# Patient Record
Sex: FEMALE
[De-identification: names, ages, dates, MRNs, and addresses within clinical notes are randomized per-mention and may not be internally consistent; named-entity substitution may affect disease eponyms.]

---

## 2024-04-05 PROBLEM — Z00.00 ENCOUNTER FOR PREVENTIVE HEALTH EXAMINATION: Status: ACTIVE | Noted: 2024-04-05

## 2024-04-19 ENCOUNTER — NON-APPOINTMENT (OUTPATIENT)
Age: 54
End: 2024-04-19

## 2024-04-19 ENCOUNTER — APPOINTMENT (OUTPATIENT)
Dept: CARDIOLOGY | Facility: CLINIC | Age: 54
End: 2024-04-19
Payer: MEDICAID

## 2024-04-19 VITALS
HEART RATE: 64 BPM | SYSTOLIC BLOOD PRESSURE: 115 MMHG | RESPIRATION RATE: 16 BRPM | BODY MASS INDEX: 34.66 KG/M2 | HEIGHT: 64 IN | DIASTOLIC BLOOD PRESSURE: 81 MMHG | WEIGHT: 203 LBS

## 2024-04-19 DIAGNOSIS — Z82.49 FAMILY HISTORY OF ISCHEMIC HEART DISEASE AND OTHER DISEASES OF THE CIRCULATORY SYSTEM: ICD-10-CM

## 2024-04-19 DIAGNOSIS — Z78.9 OTHER SPECIFIED HEALTH STATUS: ICD-10-CM

## 2024-04-19 PROCEDURE — 99204 OFFICE O/P NEW MOD 45 MIN: CPT

## 2024-04-19 PROCEDURE — 93000 ELECTROCARDIOGRAM COMPLETE: CPT

## 2024-04-19 PROCEDURE — G2211 COMPLEX E/M VISIT ADD ON: CPT | Mod: NC,1L

## 2024-04-19 RX ORDER — MELOXICAM 15 MG/1
15 TABLET ORAL
Qty: 30 | Refills: 0 | Status: ACTIVE | COMMUNITY
Start: 2024-04-19 | End: 1900-01-01

## 2024-05-02 ENCOUNTER — APPOINTMENT (OUTPATIENT)
Dept: CARDIOLOGY | Facility: CLINIC | Age: 54
End: 2024-05-02
Payer: MEDICAID

## 2024-05-02 PROCEDURE — 93306 TTE W/DOPPLER COMPLETE: CPT

## 2024-05-07 ENCOUNTER — APPOINTMENT (OUTPATIENT)
Dept: CARDIOLOGY | Facility: CLINIC | Age: 54
End: 2024-05-07
Payer: MEDICAID

## 2024-05-07 ENCOUNTER — NON-APPOINTMENT (OUTPATIENT)
Age: 54
End: 2024-05-07

## 2024-05-07 VITALS
BODY MASS INDEX: 34.31 KG/M2 | HEART RATE: 62 BPM | DIASTOLIC BLOOD PRESSURE: 72 MMHG | RESPIRATION RATE: 16 BRPM | WEIGHT: 201 LBS | HEIGHT: 64 IN | SYSTOLIC BLOOD PRESSURE: 118 MMHG

## 2024-05-07 DIAGNOSIS — I83.90 ASYMPTOMATIC VARICOSE VEINS OF UNSPECIFIED LOWER EXTREMITY: ICD-10-CM

## 2024-05-07 DIAGNOSIS — I38 ENDOCARDITIS, VALVE UNSPECIFIED: ICD-10-CM

## 2024-05-07 DIAGNOSIS — M94.0 CHONDROCOSTAL JUNCTION SYNDROME [TIETZE]: ICD-10-CM

## 2024-05-07 PROCEDURE — 93000 ELECTROCARDIOGRAM COMPLETE: CPT

## 2024-05-07 PROCEDURE — 99213 OFFICE O/P EST LOW 20 MIN: CPT

## 2024-05-07 RX ORDER — ASPIRIN 81 MG/1
81 TABLET ORAL DAILY
Refills: 0 | Status: ACTIVE | COMMUNITY

## 2024-05-07 NOTE — HISTORY OF PRESENT ILLNESS
[FreeTextEntry1] : Now reports having history for varicose vein stripping/surgery performed in her home country ([place]) and was prescribed ASA 81 mg daily by her Vascular Surgeon.   Transthoracic Echocardiogram (5/2/2024):  The LV cavity size and wall thickness are normal.  The LV systolic function is normal with LVEF 60 to 65%.  The left and right atria are normal in size and structure. Mild MR.  Moderate TR. Estimated pulmonary artery systolic pressure is 42 mmHg, consistent with mild pulmonary HTN. There was no evidence of pericardial effusion seen.

## 2024-05-07 NOTE — REASON FOR VISIT
[FreeTextEntry1] : The patient is a 54-year-old [place]utanese-speaking woman who came to our office last month for a cardiac consultation with no prior significant cardiac or medical history.  Had recently went to [facility] for a chest pain syndrome prior to cardiac consultation and considered to have costochondritis of her left lateral rib cage under her breast.  She reported that this discomfort worsened with certain movements or laying on her left side.  She admitted to doing a lot of physical labor in her backyard raking up grass/sod and plowing her garden as well as picking up her grandchildren.    She has been applying a heating pad to affected area periodically as well as taking Meloxicam 15 mg PRN for pain relief and reports her atypical chest discomfort has improved but still present from time to time.    She's back today with her  who is translating in [place]ut[place] for a general cardiac checkup and to go over results from most recent echocardiogram.    Again, patient reports feeling better since applying heating pad to affected area and taking NSAID's for pain relief, but her symptoms are still present from time to time especially with movement and/or pushing on the area.  She denies associated symptoms such as exertional substernal CP, SOB, MA, palpitations, presyncope, syncope, edema.

## 2024-05-07 NOTE — ASSESSMENT
[FreeTextEntry1] : EKG 5/7/2024:  Presenting rhythm is sinus, heart rate 62 bpm, PRWP V1 to V3, leftward axis deviation.  Essentially unchanged.   In summary, 54-year-old [place]-speaking woman who came to our office last month for a cardiac consultation with no prior significant cardiac or medical history.  Had recently went to [facility] for a chest pain syndrome prior to cardiac consultation and considered to have costochondritis of her left lateral rib cage under her breast.  She reported that this discomfort worsened with certain movements or laying on her left side.  She admitted to doing a lot of physical labor in her backyard raking up grass/sod and plowing her garden as well as picking up her grandchildren.    She has been applying a heating pad to affected area periodically as well as taking Meloxicam 15 mg PRN for pain relief and reports her atypical chest discomfort has improved but still present from time to time.    She's back today with her  who is translating in [place] for a general cardiac checkup and to go over results from most recent echocardiogram.    Again, patient reports feeling better since applying heating pad to affected area and taking NSAID's for pain relief, but her symptoms are still present from time to time especially with movement and/or pushing on the area.  She denies associated symptoms such as exertional substernal CP, SOB, MA, palpitations, presyncope, syncope, edema.   Now reports having history for varicose vein stripping/surgery performed in her home country ([place]) and was prescribed ASA 81 mg daily by her Vascular Surgeon.   Transthoracic Echocardiogram (5/2/2024):  The LV cavity size and wall thickness are normal.  The LV systolic function is normal with LVEF 60 to 65%.  The left and right atria are normal in size and structure. Mild MR.  Moderate TR. Estimated pulmonary artery systolic pressure is 42 mmHg, consistent with mild pulmonary HTN. There was no evidence of pericardial effusion seen.     PLAN:  1).  Patient reassured the echocardiogram was unremarkable showing an overall normal cardiac function and structure with only some mild valvular insufficiency.    Prior symptoms appear to be improving with applying hearting pad and taking NSAIDs PRN.  Continue with this treatment as needed.  Recommend resting as much as possible to facilitate healing process.   2).  Diet and lifestyle modification discussed including low sodium, low fat and low carbohydrate weight reducing diet.  Patient is to implement aerobic exercise regimen few days per week.   3).  Follow up with PCP regarding routine checkups and fasting blood work including lipid panel.  Forward all testing/lab work to our office.   4).  No additional cardiac testing indicated at this time. Recommend patient report any untoward symptoms.   5).  Follow up with Dr. Gardner in 6 months or PRN.

## 2024-05-07 NOTE — PHYSICAL EXAM
[Well Developed] : well developed [No Acute Distress] : no acute distress [Obese] : obese [Normal Conjunctiva] : normal conjunctiva [Normal Venous Pressure] : normal venous pressure [No Carotid Bruit] : no carotid bruit [Normal S1, S2] : normal S1, S2 [No Murmur] : no murmur [No Rub] : no rub [No Gallop] : no gallop [Clear Lung Fields] : clear lung fields [Good Air Entry] : good air entry [No Respiratory Distress] : no respiratory distress  [Soft] : abdomen soft [Non Tender] : non-tender [No Masses/organomegaly] : no masses/organomegaly [Normal Gait] : normal gait [No Edema] : no edema [No Cyanosis] : no cyanosis [No Clubbing] : no clubbing [No Varicosities] : no varicosities [No Rash] : no rash [No Skin Lesions] : no skin lesions [Moves all extremities] : moves all extremities [No Focal Deficits] : no focal deficits [Normal Speech] : normal speech [Alert and Oriented] : alert and oriented [Normal memory] : normal memory [de-identified] : Bulgarian speaking woman (translated by ) [de-identified] : reproducible left lateral chest/rib cage discomfort with palpation

## 2025-01-31 ENCOUNTER — NON-APPOINTMENT (OUTPATIENT)
Age: 55
End: 2025-01-31

## 2025-01-31 ENCOUNTER — APPOINTMENT (OUTPATIENT)
Dept: CARDIOLOGY | Facility: CLINIC | Age: 55
End: 2025-01-31
Payer: MEDICAID

## 2025-01-31 VITALS
SYSTOLIC BLOOD PRESSURE: 110 MMHG | WEIGHT: 209 LBS | DIASTOLIC BLOOD PRESSURE: 60 MMHG | HEART RATE: 69 BPM | HEIGHT: 64 IN | BODY MASS INDEX: 35.68 KG/M2 | RESPIRATION RATE: 16 BRPM

## 2025-01-31 DIAGNOSIS — R07.89 OTHER CHEST PAIN: ICD-10-CM

## 2025-01-31 DIAGNOSIS — I83.90 ASYMPTOMATIC VARICOSE VEINS OF UNSPECIFIED LOWER EXTREMITY: ICD-10-CM

## 2025-01-31 PROCEDURE — 93000 ELECTROCARDIOGRAM COMPLETE: CPT

## 2025-01-31 PROCEDURE — G2211 COMPLEX E/M VISIT ADD ON: CPT | Mod: NC

## 2025-01-31 PROCEDURE — 99214 OFFICE O/P EST MOD 30 MIN: CPT

## 2025-03-06 ENCOUNTER — APPOINTMENT (OUTPATIENT)
Dept: CARDIOLOGY | Facility: CLINIC | Age: 55
End: 2025-03-06
Payer: MEDICAID

## 2025-03-06 PROCEDURE — A9500: CPT

## 2025-03-06 PROCEDURE — 78452 HT MUSCLE IMAGE SPECT MULT: CPT

## 2025-03-06 PROCEDURE — 93015 CV STRESS TEST SUPVJ I&R: CPT

## 2025-03-06 RX ORDER — REGADENOSON 0.08 MG/ML
0.4 INJECTION, SOLUTION INTRAVENOUS
Refills: 0 | Status: COMPLETED | OUTPATIENT
Start: 2025-03-06

## 2025-03-06 RX ADMIN — REGADENOSON 0 MG/5ML: 0.08 INJECTION, SOLUTION INTRAVENOUS at 00:00

## 2025-04-02 ENCOUNTER — NON-APPOINTMENT (OUTPATIENT)
Age: 55
End: 2025-04-02

## 2025-04-02 ENCOUNTER — APPOINTMENT (OUTPATIENT)
Dept: CARDIOLOGY | Facility: CLINIC | Age: 55
End: 2025-04-02
Payer: MEDICAID

## 2025-04-02 VITALS
HEIGHT: 64 IN | BODY MASS INDEX: 36.37 KG/M2 | RESPIRATION RATE: 16 BRPM | WEIGHT: 213 LBS | DIASTOLIC BLOOD PRESSURE: 76 MMHG | SYSTOLIC BLOOD PRESSURE: 104 MMHG | HEART RATE: 76 BPM

## 2025-04-02 DIAGNOSIS — E66.9 OBESITY, UNSPECIFIED: ICD-10-CM

## 2025-04-02 DIAGNOSIS — I83.90 ASYMPTOMATIC VARICOSE VEINS OF UNSPECIFIED LOWER EXTREMITY: ICD-10-CM

## 2025-04-02 DIAGNOSIS — I38 ENDOCARDITIS, VALVE UNSPECIFIED: ICD-10-CM

## 2025-04-02 PROCEDURE — 99213 OFFICE O/P EST LOW 20 MIN: CPT

## 2025-04-02 PROCEDURE — 93000 ELECTROCARDIOGRAM COMPLETE: CPT

## 2025-06-11 ENCOUNTER — OFFICE (OUTPATIENT)
Dept: URBAN - METROPOLITAN AREA CLINIC 94 | Facility: CLINIC | Age: 55
Setting detail: OPHTHALMOLOGY
End: 2025-06-11
Payer: COMMERCIAL

## 2025-06-11 DIAGNOSIS — H52.4: ICD-10-CM

## 2025-06-11 DIAGNOSIS — H25.13: ICD-10-CM

## 2025-06-11 PROCEDURE — 92015 DETERMINE REFRACTIVE STATE: CPT | Performed by: OPHTHALMOLOGY

## 2025-06-11 PROCEDURE — 99203 OFFICE O/P NEW LOW 30 MIN: CPT | Performed by: OPHTHALMOLOGY

## 2025-06-11 ASSESSMENT — TONOMETRY
OD_IOP_MMHG: 13
OS_IOP_MMHG: 13

## 2025-06-11 ASSESSMENT — CONFRONTATIONAL VISUAL FIELD TEST (CVF)
OS_FINDINGS: FULL
OD_FINDINGS: FULL

## 2025-06-17 ASSESSMENT — VISUAL ACUITY
OS_BCVA: 20/60-1
OD_BCVA: 20/30-1

## 2025-06-17 ASSESSMENT — REFRACTION_AUTOREFRACTION
OD_CYLINDER: -0.50
OS_AXIS: 173
OD_SPHERE: +3.50
OD_AXIS: 018
OS_CYLINDER: -0.75
OS_SPHERE: +4.00

## 2025-06-17 ASSESSMENT — REFRACTION_CURRENTRX
OS_SPHERE: +2.50
OS_AXIS: 000
OD_VPRISM_DIRECTION: PROGS
OD_AXIS: 000
OS_CYLINDER: 0.00
OD_OVR_VA: 20/
OD_CYLINDER: 0.00
OD_SPHERE: +2.00
OS_ADD: +2.00
OS_OVR_VA: 20/
OS_VPRISM_DIRECTION: PROGS
OD_ADD: +2.00

## 2025-06-17 ASSESSMENT — REFRACTION_MANIFEST
OD_ADD: +2.00
OD_CYLINDER: SPH
OU_VA: 20/20
OS_VA1: 20/20
OS_CYLINDER: SPH
OD_VA1: 20/20
OD_SPHERE: +3.00
OS_ADD: +2.00
OS_SPHERE: +3.25

## 2025-06-17 ASSESSMENT — KERATOMETRY
OS_K1POWER_DIOPTERS: 42.25
OD_AXISANGLE_DEGREES: 100
OD_K2POWER_DIOPTERS: 43.00
OD_K1POWER_DIOPTERS: 42.00
OS_AXISANGLE_DEGREES: 092
OS_K2POWER_DIOPTERS: 43.25